# Patient Record
Sex: MALE | Race: ASIAN | NOT HISPANIC OR LATINO | URBAN - METROPOLITAN AREA
[De-identification: names, ages, dates, MRNs, and addresses within clinical notes are randomized per-mention and may not be internally consistent; named-entity substitution may affect disease eponyms.]

---

## 2017-02-19 ENCOUNTER — EMERGENCY (EMERGENCY)
Facility: HOSPITAL | Age: 32
LOS: 1 days | Discharge: HOME | End: 2017-02-19
Attending: EMERGENCY MEDICINE | Admitting: EMERGENCY MEDICINE
Payer: COMMERCIAL

## 2017-02-19 VITALS
RESPIRATION RATE: 18 BRPM | OXYGEN SATURATION: 100 % | HEART RATE: 71 BPM | DIASTOLIC BLOOD PRESSURE: 79 MMHG | SYSTOLIC BLOOD PRESSURE: 109 MMHG

## 2017-02-19 VITALS
OXYGEN SATURATION: 99 % | SYSTOLIC BLOOD PRESSURE: 111 MMHG | HEART RATE: 78 BPM | TEMPERATURE: 98 F | RESPIRATION RATE: 18 BRPM | DIASTOLIC BLOOD PRESSURE: 68 MMHG

## 2017-02-19 DIAGNOSIS — M79.645 PAIN IN LEFT FINGER(S): ICD-10-CM

## 2017-02-19 DIAGNOSIS — S63.237A: ICD-10-CM

## 2017-02-19 PROCEDURE — 73140 X-RAY EXAM OF FINGER(S): CPT | Mod: 26,LT

## 2017-02-19 PROCEDURE — 26770 TREAT FINGER DISLOCATION: CPT | Mod: 54

## 2017-02-19 PROCEDURE — 99284 EMERGENCY DEPT VISIT MOD MDM: CPT | Mod: 25

## 2017-02-19 RX ORDER — OXYCODONE HYDROCHLORIDE 5 MG/1
5 TABLET ORAL ONCE
Qty: 0 | Refills: 0 | Status: DISCONTINUED | OUTPATIENT
Start: 2017-02-19 | End: 2017-02-19

## 2017-02-19 RX ORDER — IBUPROFEN 200 MG
600 TABLET ORAL ONCE
Qty: 0 | Refills: 0 | Status: COMPLETED | OUTPATIENT
Start: 2017-02-19 | End: 2017-02-19

## 2017-02-19 RX ADMIN — Medication 600 MILLIGRAM(S): at 17:41

## 2017-02-19 NOTE — ED PROVIDER NOTE - DIAGNOSTIC INTERPRETATION
ER Physician: Giancarlo Pennington  INTERPRETATION:  no fracture; subluxation of PIP noted; no soft tissue swelling noted  ER Physician: Giancarlo Pennington  POST REDUCTION HAND INTERPRETATION:  improved bony alighment; no soft tissue swelling noted; no fracture

## 2017-02-19 NOTE — ED PROVIDER NOTE - PHYSICAL EXAMINATION
CON: ao x 3, HENMT: clear oropharynx, clear TM, soft neck, HEAD: atraumatic, CV: rrr, equal pulses b/l, RESP: cta b/l, GI: +BS, soft, nontender, no rebound, no guarding, SKIN: no rash, MSK: moving all extremities spontaneously, NEURO: nonfocal CON: ao x 3, HEAD: atraumatic, SKIN: no rash, MSK: L 5th digit w/ swelling and deformity noted to PIP, nv exam intact, no metacarpal tenderness or wrist tenderness, cap refill <2 sec

## 2017-02-19 NOTE — ED PROVIDER NOTE - OBJECTIVE STATEMENT
32 y/o M presents with L fifth finger deformity after playing basketball. Pt reports h/o prior dislocation and ligament injury to same finger. No numbness, tingling, weakness. No broken skin.

## 2017-02-19 NOTE — ED PROCEDURE NOTE - PROCEDURE ADDITIONAL DETAILS
traction used to reduce L fifth PIP dislocation, w/ subsequent aluminum finger splint and buddytape to adjacent digit.  NV intact after procedure.  xray reviewed.

## 2017-02-19 NOTE — ED PROVIDER NOTE - NS ED MD SCRIBE ATTENDING SCRIBE SECTIONS
PAST MEDICAL/SURGICAL/SOCIAL HISTORY/RESULTS/REVIEW OF SYSTEMS/VITAL SIGNS( Pullset)/PHYSICAL EXAM/HISTORY OF PRESENT ILLNESS/HIV